# Patient Record
Sex: FEMALE | Race: WHITE | NOT HISPANIC OR LATINO | Employment: OTHER | URBAN - METROPOLITAN AREA
[De-identification: names, ages, dates, MRNs, and addresses within clinical notes are randomized per-mention and may not be internally consistent; named-entity substitution may affect disease eponyms.]

---

## 2017-05-23 ENCOUNTER — APPOINTMENT (OUTPATIENT)
Dept: LAB | Facility: HOSPITAL | Age: 62
End: 2017-05-23

## 2024-05-06 ENCOUNTER — TELEPHONE (OUTPATIENT)
Age: 69
End: 2024-05-06

## 2024-05-30 PROBLEM — Z51.81 ENCOUNTER FOR MEDICATION MONITORING: Status: ACTIVE | Noted: 2024-05-30

## 2024-05-30 PROBLEM — R53.83 OTHER FATIGUE: Status: ACTIVE | Noted: 2024-05-30

## 2024-05-30 PROBLEM — E55.9 VITAMIN D DEFICIENCY: Status: ACTIVE | Noted: 2024-05-30

## 2024-05-30 PROBLEM — M25.50 ARTHRALGIA: Status: ACTIVE | Noted: 2024-05-30

## 2024-05-30 PROBLEM — M81.0 OSTEOPOROSIS: Status: ACTIVE | Noted: 2024-05-30

## 2024-05-30 PROBLEM — M79.7 FIBROMYALGIA: Status: ACTIVE | Noted: 2024-05-30

## 2024-05-30 PROBLEM — M17.10 ARTHRITIS OF KNEE: Status: ACTIVE | Noted: 2024-05-30

## 2024-05-30 PROBLEM — E79.0 HYPERURICEMIA: Status: ACTIVE | Noted: 2024-05-30

## 2024-05-30 PROBLEM — M35.3 PMR (POLYMYALGIA RHEUMATICA): Status: ACTIVE | Noted: 2024-05-30

## 2024-05-30 NOTE — ASSESSMENT & PLAN NOTE
This has been a longstanding diagnosis for many years.    Encouraged regular low impact aerobic exercise up to 30 minutes daily  Recommended conservative measures to help sleep  She has been intolerant of gabapentin and Lyrica  She is already on Celexa  Could consider trying amitriptyline again as she reports this helped her when she took it years ago  Continue follow-up with psych

## 2024-05-30 NOTE — ASSESSMENT & PLAN NOTE
Diagnosed by [facility] rheumatology January 2021.  Prior to being seen in our office she had been unable to wean below 11 mg of prednisone daily.  She previously had GI upset with Plaquenil  7/2023 and 10/2023 elevated inflammation markers    She was last seen in October 2023.  She feels considerably worse.  She is only on 5 mg prednisone daily and reports she has had persistent muscle pain, joint pain and swelling on this dose.  She has had elevated inflammatory markers on labs 7/2023 and again 10/2023.  Muscle enzymes normal 10/23.  We will repeat labs today  She had a lot of nausea with methotrexate, even on lower doses, so she stopped taking this.  We will work on a PA for Rudyjimmy  Increase prednisone to 10 mg daily until follow-up  We will see her back in 6 to 8 weeks

## 2024-05-30 NOTE — ASSESSMENT & PLAN NOTE
A lot of her joint pain sounds degenerative in nature    Prior MRI lumbar spine with degenerative changes and scoliosis, subacute compression fracture T12  Continue Tylenol as needed  She has been intolerant of Lyrica, gabapentin, and tramadol  We referred her to pain management 7/2023 but she did not keep the appointment

## 2024-05-30 NOTE — ASSESSMENT & PLAN NOTE
She has seen sports medicine and had steroid injections in her knees with little relief.  She is considering gel injection

## 2024-05-30 NOTE — ASSESSMENT & PLAN NOTE
She has clinical osteoporosis with compression fracture at T12.  By the time compression fracture was found in March 2023 was more subacute to chronic compression fracture.  We have discussed is very important for her to wean prednisone. We will wean prednisone as able.  We initially started her on Fosamax March 2023, but she reports intermittent compliance due to dental work.  We discussed she should hold the medication for at least 3 months as she recently had a bridge.  Continue vitamin D supplement and get calcium through diet  We will arrange for DEXA

## 2024-05-31 ENCOUNTER — TELEPHONE (OUTPATIENT)
Age: 69
End: 2024-05-31

## 2024-05-31 ENCOUNTER — OFFICE VISIT (OUTPATIENT)
Age: 69
End: 2024-05-31
Payer: MEDICARE

## 2024-05-31 ENCOUNTER — LAB (OUTPATIENT)
Facility: HOSPITAL | Age: 69
End: 2024-05-31
Payer: MEDICARE

## 2024-05-31 VITALS
TEMPERATURE: 97.9 F | HEART RATE: 85 BPM | DIASTOLIC BLOOD PRESSURE: 90 MMHG | WEIGHT: 196.6 LBS | HEIGHT: 65 IN | SYSTOLIC BLOOD PRESSURE: 130 MMHG | BODY MASS INDEX: 32.76 KG/M2

## 2024-05-31 DIAGNOSIS — E79.0 HYPERURICEMIA: ICD-10-CM

## 2024-05-31 DIAGNOSIS — M35.3 PMR (POLYMYALGIA RHEUMATICA): ICD-10-CM

## 2024-05-31 DIAGNOSIS — E55.9 VITAMIN D DEFICIENCY: ICD-10-CM

## 2024-05-31 DIAGNOSIS — M17.10 ARTHRITIS OF KNEE: ICD-10-CM

## 2024-05-31 DIAGNOSIS — Z51.81 ENCOUNTER FOR MEDICATION MONITORING: ICD-10-CM

## 2024-05-31 DIAGNOSIS — M35.3 PMR (POLYMYALGIA RHEUMATICA): Primary | ICD-10-CM

## 2024-05-31 DIAGNOSIS — M79.7 FIBROMYALGIA: ICD-10-CM

## 2024-05-31 DIAGNOSIS — R53.83 OTHER FATIGUE: ICD-10-CM

## 2024-05-31 DIAGNOSIS — M81.0 OSTEOPOROSIS, UNSPECIFIED OSTEOPOROSIS TYPE, UNSPECIFIED PATHOLOGICAL FRACTURE PRESENCE: ICD-10-CM

## 2024-05-31 DIAGNOSIS — Z78.0 POST-MENOPAUSE: ICD-10-CM

## 2024-05-31 DIAGNOSIS — M25.50 ARTHRALGIA, UNSPECIFIED JOINT: ICD-10-CM

## 2024-05-31 PROBLEM — F32.9 MAJOR DEPRESSIVE DISORDER: Status: ACTIVE | Noted: 2023-05-12

## 2024-05-31 PROBLEM — R73.03 PREDIABETES: Status: ACTIVE | Noted: 2023-05-12

## 2024-05-31 LAB
ERYTHROCYTE [SEDIMENTATION RATE] IN BLOOD: 70 MM/HR (ref 0–30)
HAV IGM SERPL QL IA: NORMAL
HBV CORE IGM SERPL QL IA: NORMAL
HBV SURFACE AG SERPL QL IA: NORMAL
HCV AB SER QL: NORMAL

## 2024-05-31 PROCEDURE — 82550 ASSAY OF CK (CPK): CPT

## 2024-05-31 PROCEDURE — 86140 C-REACTIVE PROTEIN: CPT

## 2024-05-31 PROCEDURE — 36415 COLL VENOUS BLD VENIPUNCTURE: CPT

## 2024-05-31 PROCEDURE — 84550 ASSAY OF BLOOD/URIC ACID: CPT

## 2024-05-31 PROCEDURE — 85007 BL SMEAR W/DIFF WBC COUNT: CPT

## 2024-05-31 PROCEDURE — 82085 ASSAY OF ALDOLASE: CPT

## 2024-05-31 PROCEDURE — 80074 ACUTE HEPATITIS PANEL: CPT

## 2024-05-31 PROCEDURE — 85027 COMPLETE CBC AUTOMATED: CPT

## 2024-05-31 PROCEDURE — 80053 COMPREHEN METABOLIC PANEL: CPT

## 2024-05-31 PROCEDURE — 82306 VITAMIN D 25 HYDROXY: CPT

## 2024-05-31 PROCEDURE — 85652 RBC SED RATE AUTOMATED: CPT

## 2024-05-31 PROCEDURE — 86480 TB TEST CELL IMMUN MEASURE: CPT

## 2024-05-31 RX ORDER — PREDNISONE 5 MG/1
10 TABLET ORAL DAILY
Qty: 60 TABLET | Refills: 3 | Status: SHIPPED | OUTPATIENT
Start: 2024-05-31

## 2024-05-31 RX ORDER — TRAMADOL HYDROCHLORIDE 50 MG/1
50 TABLET ORAL EVERY 6 HOURS PRN
Qty: 120 TABLET | Refills: 3 | Status: CANCELLED | OUTPATIENT
Start: 2024-05-31

## 2024-05-31 NOTE — TELEPHONE ENCOUNTER
Yes I am okay with this. We are going to try this for her PMR to try to wean her prednisone. Thanks

## 2024-05-31 NOTE — PROGRESS NOTES
Office Visit       Date: 05/31/2024   Patient Name: Scot Good  MRN: [id number]  YOB: 1955    Referring Physician: Provider, No Known     Chief Complaint:   Chief Complaint   Patient presents with    Arthritis    Medication       History of Present Illness: Scot Good is a 69 y.o. female who is here today for follow-up of possible PMR.  She has a past medical history of hypertension, hyperlipidemia, fibromyalgia, GERD, and osteopenia.  She has been seen previously for fibromyalgia by Dr. Kyrie Hood.  She was also previously seeing a [facility] rheumatology nurse practitioner and was undergoing a prednisone taper since January 2021 without being able to wean below 11 mg daily historically. Her labs 10/27/2021 showed an ESR of 51 and CRP of 3.07 (<0.49).  On 4/30/2018 her CRP and sed rate were normal.    She reestablished care with ACL in March 2023.  At that time her CRP was 13 (0-10) and her sed rate was normal on prednisone 11 mg daily.  RF, CCP, WELLINGTON panel were negative.  She had an WELLINGTON 1-80 homogenous.  We also found a compression fracture of T12 at this initial office visit.     Today she rates her pain 8/10 with 1 hour a day of morning stiffness.  She reports she feels worse.    She is currently taking 5 mg of prednisone daily. Continued muscle pain and joint pain. She endorses swelling in the joints, particularly hands and wrists.  We previously prescribed her methotrexate and folic acid.  She has had considerable nausea even on lower doses so she stopped taking this.  For pain we also have prescribed her Lyrica and tramadol in the past.  She reports the Lyrica made her feel angry and made her snap at people.  Tramadol made her have constipation.  She is not taking either medication.   She had dental work in the interim so she is not taking the alendronate currently.  She reports she had a bone density test a couple years ago.  She now has a new primary care  provider.  She would like us to order a bone density test for her today.  Previously intolerant of gabapentin.  We historically referred her to pain management but she did not keep the appointment.  She has had steroid injections [facility] her right [facility]. These are not helping much. She has been approved for gel injection and is considering trying this.    Review of systems:  Review of Systems   Constitutional:  Positive for fatigue.   HENT: Negative.     Eyes: Negative.    Respiratory: Negative.     Cardiovascular: Negative.    Gastrointestinal:         Hemorrhoids   Endocrine: Negative.    Genitourinary: Negative.    Musculoskeletal:  Positive for arthralgias, back pain, gait problem, myalgias, neck pain and neck stiffness.   Skin:         Itching  Nail changes   Allergic/Immunologic: Negative.    Hematological:  Bruises/bleeds easily.   Psychiatric/Behavioral:  Positive for dysphoric mood. The patient is nervous/anxious.        Past Medical History:   Past Medical History:   Diagnosis Date    Arthritis     GERD (gastroesophageal reflux disease)     High cholesterol     Hypertension     Osteopenia        Past Surgical History:   Past Surgical History:   Procedure Laterality Date    BUNIONECTOMY      ON FOOT    TUBAL ABDOMINAL LIGATION         Family History:   Family History   Problem Relation Age of Onset    Heart disease Mother     Diabetes Father     Neuropathy Father     Neuropathy Brother        Social History:   Social History     Socioeconomic History    Marital status:    Tobacco Use    Smoking status: Never   Vaping Use    Vaping status: Never Used   Substance and Sexual Activity    Alcohol use: Not Currently    Drug use: Never    Sexual activity: Defer       Medications:   Current Outpatient Medications:     ALPRAZolam (XANAX) 0.25 MG tablet, Take 1 tablet by mouth 3 (Three) Times a Day., Disp: , Rfl:     citalopram (CeleXA) 40 MG tablet, Take 1 tablet by mouth Daily., Disp: ,  "Rfl:     lansoprazole (PREVACID) 15 MG capsule, Take 1 capsule by mouth Daily. BEFORE A MEAL, Disp: , Rfl:     potassium chloride (KLOR-CON) 8 MEQ CR tablet, Take 1 tablet by mouth Daily. WITH FOOD, Disp: , Rfl:     predniSONE (DELTASONE) 5 MG tablet, Take 2 tablets by mouth Daily., Disp: 60 tablet, Rfl: 3    triamterene-hydrochlorothiazide (DYAZIDE) 37.5-25 MG per capsule, Take 1 capsule by mouth Every Morning., Disp: , Rfl:     alendronate (FOSAMAX) 70 MG tablet, Take 1 tablet by mouth Every 7 (Seven) Days. AT LEAST 30 MINUTES BEFORE FIRST FOOD, BEVERAGE OR MEDICATION OF THE DAY (Patient not taking: Reported on 5/31/2024), Disp: , Rfl:     Allergies:   Allergies   Allergen Reactions    [facility] Provider Review Needed       I reviewed the patient's chief complaint, history of present illness, review of systems, past medical history, surgical history, family history, social history, medications and allergy list.     Objective    Vital Signs:   Vitals:    05/31/24 0845   BP: 130/90   BP Location: Left arm   Patient Position: Sitting   Cuff Size: Adult   Pulse: 85   Temp: 97.9 °F (36.6 °C)   TempSrc: Temporal   Weight: 89.2 kg (196 lb 9.6 oz)   Height: 165.1 cm (65\")   PainSc:   8   PainLoc: Generalized     Body mass index is 32.72 kg/m².   BMI cannot be calculated due to outdated height or weight values.  Please input a current height/weight in Vitals and re-renter BMIFOLLOWUP in Note to pull in correct documentation based on BMI range.       Physical Exam:  Physical Exam  Constitutional:       Appearance: Normal appearance. She is obese.   HENT:      Head: Normocephalic and atraumatic.   Eyes:      Conjunctiva/sclera: Conjunctivae normal.      Pupils: Pupils are equal, round, and reactive to light.   Cardiovascular:      Rate and Rhythm: Normal rate and regular rhythm.      Heart sounds: Normal heart sounds.   Pulmonary:      Effort: Pulmonary effort is normal.      Breath sounds: Normal breath sounds. "   Musculoskeletal:         General: Normal range of motion.      Cervical back: Normal range of motion.   Skin:     General: Skin is warm and dry.   Neurological:      General: No focal deficit present.      Mental Status: She is alert and oriented to person, place, and time.   Psychiatric:         Mood and Affect: Mood normal.         Behavior: Behavior normal.         Thought Content: Thought content normal.         Judgment: Judgment normal.          Results Review:   Imaging Results (Last 24 Hours)       ** No results found for the last 24 hours. **            Assessment / Plan    Assessment/Plan:   Diagnoses and all orders for this visit:    1. PMR (polymyalgia rheumatica) (Primary)  Assessment & Plan:  Diagnosed by [facility] rheumatology January 2021.  Prior to being seen in our office she had been unable to wean below 11 mg of prednisone daily.  She previously had GI upset with Plaquenil  7/2023 and 10/2023 elevated inflammation markers    She was last seen in October 2023.  She feels considerably worse.  She is only on 5 mg prednisone daily and reports she has had persistent muscle pain, joint pain and swelling on this dose.  She has had elevated inflammatory markers on labs 7/2023 and again 10/2023.  Muscle enzymes normal 10/23.  We will repeat labs today  She had a lot of nausea with methotrexate, even on lower doses, so she stopped taking this.  We will work on a PA for Rudyjimmy  Increase prednisone to 10 mg daily until follow-up  We will see her back in 6 to 8 weeks    Orders:  -     CK; Future  -     CBC With Manual Differential; Future  -     Comprehensive Metabolic Panel; Future  -     C-reactive Protein; Future  -     Sedimentation Rate; Future  -     Aldolase; Future  -     predniSONE (DELTASONE) 5 MG tablet; Take 2 tablets by mouth Daily.  Dispense: 60 tablet; Refill: 3  -     QuantiFERON TB Plus Client Incubated; Future  -     Hepatitis Panel, Acute; Future    2. Fibromyalgia  Assessment &  Plan:  This has been a longstanding diagnosis for many years.    Encouraged regular low impact aerobic exercise up to 30 minutes daily  Recommended conservative measures to help sleep  She has been intolerant of gabapentin and Lyrica  She is already on Celexa  Could consider trying amitriptyline again as she reports this helped her when she took it years ago  Continue follow-up with psych    Orders:  -     CK; Future  -     CBC With Manual Differential; Future  -     Comprehensive Metabolic Panel; Future  -     C-reactive Protein; Future  -     Sedimentation Rate; Future  -     Aldolase; Future  -     Vitamin D,25-Hydroxy; Future    3. Osteoporosis, unspecified osteoporosis type, unspecified pathological fracture presence  Assessment & Plan:  She has clinical osteoporosis with compression fracture at T12.  By the time compression fracture was found in March 2023 was more subacute to chronic compression fracture.  We have discussed is very important for her to wean prednisone. We will wean prednisone as able.  We initially started her on Fosamax March 2023, but she reports intermittent compliance due to dental work.  We discussed she should hold the medication for at least 3 months as she recently had a bridge.  Continue vitamin D supplement and get calcium through diet  We will arrange for DEXA      Orders:  -     DEXA Bone Density Axial; Future    4. Arthralgia, unspecified joint  Assessment & Plan:  A lot of her joint pain sounds degenerative in nature    Prior MRI lumbar spine with degenerative changes and scoliosis, subacute compression fracture T12  Continue Tylenol as needed  She has been intolerant of Lyrica, gabapentin, and tramadol  We referred her to pain management 7/2023 but she did not keep the appointment    Orders:  -     CK; Future  -     CBC With Manual Differential; Future  -     Comprehensive Metabolic Panel; Future  -     C-reactive Protein; Future  -     Sedimentation Rate; Future  -      Aldolase; Future  -     Vitamin D,25-Hydroxy; Future    5. Other fatigue  Assessment & Plan:  Check QTB and hepatitis panel today    Orders:  -     CK; Future  -     CBC With Manual Differential; Future  -     Comprehensive Metabolic Panel; Future  -     C-reactive Protein; Future  -     Sedimentation Rate; Future  -     Aldolase; Future  -     Vitamin D,25-Hydroxy; Future  -     QuantiFERON TB Plus Client Incubated; Future  -     Hepatitis Panel, Acute; Future    6. Arthritis of knee  Assessment & Plan:  She has seen sports medicine and had steroid injections in her knees with little relief.  She is considering gel injection      7. Hyperuricemia  Assessment & Plan:  3/2023 uric acid 7.9  She did not tolerate allopurinol    Orders:  -     Uric Acid; Future    8. Vitamin D deficiency  Assessment & Plan:  3/2023 vitamin D 17.4    Continue vitamin D3 2000 IU daily  Recheck vitamin D level    Orders:  -     Vitamin D,25-Hydroxy; Future    9. Encounter for medication monitoring  Assessment & Plan:  PDMP reviewed  She is no longer taking tramadol or Lyrica    Orders:  -     QuantiFERON TB Plus Client Incubated; Future  -     Hepatitis Panel, Acute; Future    10. Post-menopause  -     DEXA Bone Density Axial; Future        Follow Up:   Return in about 8 weeks (around 7/26/2024) for Dr. Hood.        JOHN PAUL Coker   [facility]

## 2024-05-31 NOTE — TELEPHONE ENCOUNTER
Please PA Kevzara for PMR.  She has failed methotrexate.  She has had a hard time weaning off prednisone

## 2024-06-01 LAB
25(OH)D3 SERPL-MCNC: 39.7 NG/ML (ref 30–100)
ALBUMIN SERPL-MCNC: 3.8 G/DL (ref 3.5–5.2)
ALBUMIN/GLOB SERPL: 1.3 G/DL
ALP SERPL-CCNC: 65 U/L (ref 39–117)
ALT SERPL W P-5'-P-CCNC: 17 U/L (ref 1–33)
ANION GAP SERPL CALCULATED.3IONS-SCNC: 16.8 MMOL/L (ref 5–15)
ANISOCYTOSIS BLD QL: ABNORMAL
AST SERPL-CCNC: 13 U/L (ref 1–32)
BASOPHILS # BLD MANUAL: 0.13 10*3/MM3 (ref 0–0.2)
BASOPHILS NFR BLD MANUAL: 1.1 % (ref 0–1.5)
BILIRUB SERPL-MCNC: 0.3 MG/DL (ref 0–1.2)
BUN SERPL-MCNC: 11 MG/DL (ref 8–23)
BUN/CREAT SERPL: 12.8 (ref 7–25)
CALCIUM SPEC-SCNC: 9.6 MG/DL (ref 8.6–10.5)
CHLORIDE SERPL-SCNC: 95 MMOL/L (ref 98–107)
CK SERPL-CCNC: 29 U/L (ref 20–180)
CO2 SERPL-SCNC: 25.2 MMOL/L (ref 22–29)
CREAT SERPL-MCNC: 0.86 MG/DL (ref 0.57–1)
CRP SERPL-MCNC: 10.58 MG/DL (ref 0–0.5)
DEPRECATED RDW RBC AUTO: 38.6 FL (ref 37–54)
EGFRCR SERPLBLD CKD-EPI 2021: 74.2 ML/MIN/1.73
EOSINOPHIL # BLD MANUAL: 0.48 10*3/MM3 (ref 0–0.4)
EOSINOPHIL NFR BLD MANUAL: 4.2 % (ref 0.3–6.2)
ERYTHROCYTE [DISTWIDTH] IN BLOOD BY AUTOMATED COUNT: 13.1 % (ref 12.3–15.4)
GLOBULIN UR ELPH-MCNC: 3 GM/DL
GLUCOSE SERPL-MCNC: 50 MG/DL (ref 65–99)
HCT VFR BLD AUTO: 35.9 % (ref 34–46.6)
HGB BLD-MCNC: 11.7 G/DL (ref 12–15.9)
LYMPHOCYTES # BLD MANUAL: 3.39 10*3/MM3 (ref 0.7–3.1)
LYMPHOCYTES NFR BLD MANUAL: 16.8 % (ref 5–12)
MCH RBC QN AUTO: 26.5 PG (ref 26.6–33)
MCHC RBC AUTO-ENTMCNC: 32.6 G/DL (ref 31.5–35.7)
MCV RBC AUTO: 81.4 FL (ref 79–97)
MICROCYTES BLD QL: ABNORMAL
MONOCYTES # BLD: 1.93 10*3/MM3 (ref 0.1–0.9)
NEUTROPHILS # BLD AUTO: 5.56 10*3/MM3 (ref 1.7–7)
NEUTROPHILS NFR BLD MANUAL: 48.4 % (ref 42.7–76)
PLAT MORPH BLD: NORMAL
PLATELET # BLD AUTO: 441 10*3/MM3 (ref 140–450)
PMV BLD AUTO: 10.2 FL (ref 6–12)
POIKILOCYTOSIS BLD QL SMEAR: ABNORMAL
POTASSIUM SERPL-SCNC: 3 MMOL/L (ref 3.5–5.2)
PROT SERPL-MCNC: 6.8 G/DL (ref 6–8.5)
RBC # BLD AUTO: 4.41 10*6/MM3 (ref 3.77–5.28)
SMUDGE CELLS BLD QL SMEAR: ABNORMAL
SODIUM SERPL-SCNC: 137 MMOL/L (ref 136–145)
URATE SERPL-MCNC: 5.9 MG/DL (ref 2.4–5.7)
VARIANT LYMPHS NFR BLD MANUAL: 24.2 % (ref 19.6–45.3)
VARIANT LYMPHS NFR BLD MANUAL: 5.3 % (ref 0–5)
WBC NRBC COR # BLD AUTO: 11.48 10*3/MM3 (ref 3.4–10.8)

## 2024-06-03 ENCOUNTER — TELEPHONE (OUTPATIENT)
Age: 69
End: 2024-06-03
Payer: MEDICARE

## 2024-06-03 DIAGNOSIS — M35.3 PMR (POLYMYALGIA RHEUMATICA): Primary | ICD-10-CM

## 2024-06-03 LAB — ALDOLASE SERPL-CCNC: 3.9 U/L (ref 3.3–10.3)

## 2024-06-03 NOTE — TELEPHONE ENCOUNTER
Please forward to [facility].   I have never seen this patient and am not sure if the treatment is indicated. I did not order this medication nor discuss it with the pt.

## 2024-06-04 LAB
GAMMA INTERFERON BACKGROUND BLD IA-ACNC: 0.04 IU/ML
M TB IFN-G BLD-IMP: NEGATIVE
M TB IFN-G CD4+ BCKGRND COR BLD-ACNC: 0 IU/ML
M TB IFN-G CD4+CD8+ BCKGRND COR BLD-ACNC: 0 IU/ML
MITOGEN IGNF BCKGRD COR BLD-ACNC: 3.19 IU/ML
SERVICE CMNT-IMP: NORMAL

## 2024-06-06 NOTE — TELEPHONE ENCOUNTER
Patient called in today and stated that she is aware that her IV Actemra has been approved but she just a message from her insurance company that the Kevzara was approved as well. She states she would rather have the injection over the infusion.     Can you look into see if this has in fact been approved and send into [facility] for review?

## 2024-06-07 NOTE — TELEPHONE ENCOUNTER
I called pt to give her the appt for her IV Actemra. She stated her insurance notified her that she is approved for Kevzara injections as well. She does not want to pursure IV Actemra, she would like to do the Kevzara. Please look into this for the pt and let her know. Thank you!

## 2024-06-12 RX ORDER — SODIUM CHLORIDE 9 MG/ML
20 INJECTION, SOLUTION INTRAVENOUS ONCE
Status: CANCELLED | OUTPATIENT
Start: 2024-06-12

## 2024-06-12 RX ORDER — DIPHENHYDRAMINE HCL 25 MG
25 CAPSULE ORAL AS NEEDED
Status: CANCELLED | OUTPATIENT
Start: 2024-06-12

## 2024-06-13 ENCOUNTER — INFUSION (OUTPATIENT)
Dept: ONCOLOGY | Facility: HOSPITAL | Age: 69
End: 2024-06-13
Payer: MEDICARE

## 2024-06-13 VITALS
RESPIRATION RATE: 18 BRPM | HEART RATE: 88 BPM | TEMPERATURE: 97.8 F | DIASTOLIC BLOOD PRESSURE: 76 MMHG | WEIGHT: 197.8 LBS | BODY MASS INDEX: 32.92 KG/M2 | SYSTOLIC BLOOD PRESSURE: 155 MMHG

## 2024-06-13 DIAGNOSIS — M35.3 PMR (POLYMYALGIA RHEUMATICA): Primary | ICD-10-CM

## 2024-06-13 PROCEDURE — 25010000002 TOCILIZUMAB 400 MG/20ML SOLUTION 20 ML VIAL: Performed by: INTERNAL MEDICINE

## 2024-06-13 PROCEDURE — 25810000003 SODIUM CHLORIDE 0.9 % SOLUTION: Performed by: INTERNAL MEDICINE

## 2024-06-13 PROCEDURE — 96365 THER/PROPH/DIAG IV INF INIT: CPT

## 2024-06-13 RX ORDER — DIPHENHYDRAMINE HCL 25 MG
25 CAPSULE ORAL AS NEEDED
OUTPATIENT
Start: 2024-07-11

## 2024-06-13 RX ORDER — METHYLPREDNISOLONE SODIUM SUCCINATE 125 MG/2ML
125 INJECTION, POWDER, LYOPHILIZED, FOR SOLUTION INTRAMUSCULAR; INTRAVENOUS AS NEEDED
OUTPATIENT
Start: 2024-07-11

## 2024-06-13 RX ORDER — SODIUM CHLORIDE 9 MG/ML
20 INJECTION, SOLUTION INTRAVENOUS ONCE
Status: COMPLETED | OUTPATIENT
Start: 2024-06-13 | End: 2024-06-13

## 2024-06-13 RX ORDER — SODIUM CHLORIDE 9 MG/ML
20 INJECTION, SOLUTION INTRAVENOUS ONCE
OUTPATIENT
Start: 2024-07-11

## 2024-06-13 RX ADMIN — TOCILIZUMAB 360 MG: 20 INJECTION, SOLUTION, CONCENTRATE INTRAVENOUS at 09:00

## 2024-06-13 RX ADMIN — SODIUM CHLORIDE 20 ML/HR: 9 INJECTION, SOLUTION INTRAVENOUS at 09:00

## 2024-06-18 ENCOUNTER — TELEPHONE (OUTPATIENT)
Age: 69
End: 2024-06-18
Payer: MEDICARE

## 2024-06-18 NOTE — TELEPHONE ENCOUNTER
----- Message from Karen BEACH sent at 6/11/2024  8:01 AM EDT -----  Regarding: Actemra  Please sign Actemra order on 6/13.  Thank you!

## 2024-06-26 ENCOUNTER — HOSPITAL ENCOUNTER (OUTPATIENT)
Dept: BONE DENSITY | Facility: HOSPITAL | Age: 69
Discharge: HOME | End: 2024-06-26
Admitting: NURSE PRACTITIONER
Payer: MEDICARE

## 2024-06-26 DIAGNOSIS — Z78.0 POST-MENOPAUSE: ICD-10-CM

## 2024-06-26 DIAGNOSIS — M81.0 OSTEOPOROSIS, UNSPECIFIED OSTEOPOROSIS TYPE, UNSPECIFIED PATHOLOGICAL FRACTURE PRESENCE: ICD-10-CM

## 2024-06-26 PROCEDURE — 77080 DXA BONE DENSITY AXIAL: CPT

## 2024-07-08 ENCOUNTER — TELEPHONE (OUTPATIENT)
Age: 69
End: 2024-07-08
Payer: MEDICARE

## 2024-07-09 ENCOUNTER — SPECIALTY PHARMACY (OUTPATIENT)
Age: 69
End: 2024-07-09
Payer: MEDICARE

## 2024-07-09 RX ORDER — TOCILIZUMAB 180 MG/ML
162 INJECTION, SOLUTION SUBCUTANEOUS
COMMUNITY
End: 2024-07-11 | Stop reason: SDUPTHER

## 2024-07-09 NOTE — TELEPHONE ENCOUNTER
Patient's Actemra Infusion costs are really high and she is wanting to switch to an injectable Actemra.  She talked to the drug company yesterday for assistance.    Patient is cancelling her next infusion.

## 2024-07-11 ENCOUNTER — SPECIALTY PHARMACY (OUTPATIENT)
Dept: GENERAL RADIOLOGY | Facility: HOSPITAL | Age: 69
End: 2024-07-11
Payer: MEDICARE

## 2024-07-11 ENCOUNTER — TELEPHONE (OUTPATIENT)
Dept: GENERAL RADIOLOGY | Facility: HOSPITAL | Age: 69
End: 2024-07-11
Payer: MEDICARE

## 2024-07-11 RX ORDER — TOCILIZUMAB 180 MG/ML
162 INJECTION, SOLUTION SUBCUTANEOUS
Qty: 1.8 ML | Refills: 3 | Status: SHIPPED | OUTPATIENT
Start: 2024-07-11

## 2024-07-11 NOTE — PROGRESS NOTES
Specialty Pharmacy Patient Management Program  Rheumatology Initial Assessment     David Good was referred by an Rheumatology provider to the Rheumatology Patient Management program offered by [facility] for Polymyalgia Rheumatica on 07/11/24.  An initial outreach was conducted, including assessment of therapy appropriateness and specialty medication education for Actemra 162mg every 14 days. The patient was introduced to services offered by [facility], including: regular assessments, refill coordination, curbside pick-up or mail order delivery options, prior authorization maintenance, and financial assistance programs as applicable. The patient was also provided with contact information for the pharmacy team.     Insurance Coverage & Financial Support  [place]     Relevant Past Medical History and Comorbidities  Relevant medical history and concomitant health conditions were discussed with the patient. The patient's chart has been reviewed for relevant past medical history and comorbid conditions and updated as necessary.  Past Medical History:   Diagnosis Date    Arthritis     GERD (gastroesophageal reflux disease)     High cholesterol     Hypertension     Osteopenia      Social History     Socioeconomic History    Marital status:    Tobacco Use    Smoking status: Never   Vaping Use    Vaping status: Never Used   Substance and Sexual Activity    Alcohol use: Not Currently    Drug use: Never    Sexual activity: Defer       Problem list reviewed by Victoriano Segura RPH on 7/11/2024 at  2:59 PM    Allergies  Known allergies and reactions were discussed with the patient. The patient's chart has been reviewed for  allergy information and updated as necessary.   Allergies   Allergen Reactions    [facility] Provider Review Needed       Allergies reviewed by Victoriano Segura RPH on 7/11/2024 at  2:58 PM    Relevant Laboratory Values  Relevant laboratory  "values were discussed with the patient. The following specialty medication dose adjustment(s) are recommended: Start Actemra 162mg every 14 days    Lab Results   Component Value Date    HGBA1C 5.8 09/18/2014     Lab Results   Component Value Date    GLUCOSE 50 (L) 05/31/2024    CALCIUM 9.6 05/31/2024     05/31/2024    K 3.0 (L) 05/31/2024    CO2 25.2 05/31/2024    CL 95 (L) 05/31/2024    BUN 11 05/31/2024    CREATININE 0.86 05/31/2024    BCR 12.8 05/31/2024    ANIONGAP 16.8 (H) 05/31/2024     No results found for: \"CHOL\", \"CHLPL\", \"TRIG\", \"HDL\", \"LDL\", \"LDLDIRECT\"      Current Medication List  This medication list has been reviewed with the patient and evaluated for any interactions or necessary modifications/recommendations, and updated to include all prescription medications, OTC medications, and supplements the patient is currently taking.  This list reflects what is contained in the patient's profile, which has also been marked as reviewed to communicate to other providers it is the most up to date version of the patient's current medication therapy.     Current Outpatient Medications:     alendronate (FOSAMAX) 70 MG tablet, Take 1 tablet by mouth Every 7 (Seven) Days. AT LEAST 30 MINUTES BEFORE FIRST FOOD, BEVERAGE OR MEDICATION OF THE DAY (Patient not taking: Reported on 5/31/2024), Disp: , Rfl:     ALPRAZolam (XANAX) 0.25 MG tablet, Take 1 tablet by mouth 3 (Three) Times a Day., Disp: , Rfl:     citalopram (CeleXA) 40 MG tablet, Take 1 tablet by mouth Daily., Disp: , Rfl:     lansoprazole (PREVACID) 15 MG capsule, Take 1 capsule by mouth Daily. BEFORE A MEAL, Disp: , Rfl:     potassium chloride (KLOR-CON) 8 MEQ CR tablet, Take 1 tablet by mouth Daily. WITH FOOD, Disp: , Rfl:     predniSONE (DELTASONE) 5 MG tablet, Take 2 tablets by mouth Daily., Disp: 60 tablet, Rfl: 3    Tocilizumab (Actemra ACTPen) 162 MG/0.9ML solution auto-injector injection, Inject 0.9 mL under the skin into the appropriate area as " directed Every 14 (Fourteen) Days., Disp: 1.8 mL, Rfl: 3    triamterene-hydrochlorothiazide (DYAZIDE) 37.5-25 MG per capsule, Take 1 capsule by mouth Every Morning., Disp: , Rfl:   No current facility-administered medications for this visit.    Medicines reviewed by Victoriano Segura [facility] on 7/11/2024 at  2:59 PM    Drug Interactions  No Clinically Significant DDIs Were Identified at Present Time Upon Marking Medications Reviewed    Initial Education Provided for Specialty Medication  The patient has been provided with the following education and any applicable administration techniques (i.e. self-injection) have been demonstrated for the therapies indicated. All questions and concerns have been addressed prior to the patient receiving the medication, and the patient has verbalized comprehension of the education and any materials provided. Additional patient education shall be provided and documented upon request by the patient, provider, or payer.          Actemra (tocilizumab)           Medication Expectations   Why am I taking this medication? You are taking this medication for polyarticular juvenile idiopathic arthritis (PIJA), systemic juvenile idiopathic arthritis (SJIA), rheumatoid arthritis, giant cell arteritis, or systemic sclerosis-associated interstitial lung disease.   What should I expect while on this medication? You should expect a decrease in the frequency and severity of symptoms.   How does the medication work? Tocilizumab is a humanized IL-6 receptor-inhibiting monoclonal antibody. Inhibiting IL-6 receptors leads to a reduction in the production of proinflammatory cytokines.   How long will I be on this medication for? The amount of time you will be on this medication will be determined by your doctor and your response to the medication.    How do I take this medication? Take as directed on your prescription label.  This medication is a subcutaneous injection given in the fatty part of the  skin on the top of the thigh, upper outer arm, or stomach area. You should rotate injection sites. Allow to reach room temperature prior to injection (~30 minutes for syringe or ~45 minutes for auto-injector).   What are some possible side effects? Injection site reactions and hypersensitivity reactions, increased liver enzymes or cholesterol, constipation or diarrhea, high blood pressure, signs of a common cold or upper respiratory tract infection, or infusion-related reaction (flushing, chills, headache).   What happens if I miss a dose? If you miss a dose, take it as soon as you remember. If it is close to the time for your next dose, skip the missed dose and go back to your normal time.                    Medication Safety   What are things I should warn my doctor immediately about? Allergic reaction such has hives or trouble breathing. If you develop symptoms of infection that do not go away, weight loss, changes in bowel habits or severe abdominal pain, or muscle pain or weakness.     What are things that I should be cautious of? Injection site reaction and upper respiratory infection. Be sure to complete any follow-up labs ordered by your doctor. You may have more chance of getting an infection.  Wash your hands often and stay away from people with infections, colds, or flu.   What are some medications that can interact with this one? Immunosuppressants and vaccines.            Medication Storage/Handling   How should I handle this medication? Keep this medication our of reach of pets/children in original container. Store in the original container to protect from light. Do not freeze or shake. Do not inject where the skin is tender, bruised, red, hard, or where there are moles, scars, or stretch marks.   How does this medication need to be stored? Store in refrigerator and keep dry. If needed, you may store at room temperature for up to 24 hours. Do not return to fridge once stored at room temperature.    How  should I dispose of this medication? You can dispose of the syringe in a sharps container, and if this is not available you may use an empty hard plastic container such as a milk jug or tide container. Discard any unused portion or if stored outside of refrigerator > 5 days.            Resources/Support   How can I remind myself to take this medication? You can download a reminder [email or web address] on your phone or use a calandar  to help with your injection.   Is financial support available?  Yes, [employer] can provide co-pay assisstance if you have commercial insurance or patient assistance if you have Medicare or no insurance.    Which vaccines are recommended for me? Talk to your doctor about these vaccines: Flu, Coronavirus (COVID-19), Pneumococcal (pneumonia), Tdap, Hepatitis B, Zoster (shingles).               Adherence and Self-Administration  Adherence related to the patient's specialty therapy was discussed with the patient. The Adherence segment of this outreach has been reviewed and updated.     Is there a concern with patient's ability to self administer the medication correctly and without issue?: No  Were any potential barriers to adherence identified during the initial assessment or patient education?: No  Are there any concerns regarding the patient's understanding of the importance of medication adherence?: No  Methods for Supporting Patient Adherence and/or Self-Administration: Did a video assessment to educate the patient on proper administration. The patient shows good understanding on proper administration technique.     Open Medication Therapy Problems  No medication therapy recommendations to display    Goals of Therapy  Goals related to the patient's specialty therapy were discussed with the patient. The Patient Goals segment of this outreach has been reviewed and updated.   Goals Addressed Today        Specialty Pharmacy General Goal      Reduce number of flares and pain score.               Reassessment  Plan & Follow-Up  1. Medication Therapy Changes: Start Actemra 162mg every 14 days   2. Related Plans, Therapy Recommendations, or Therapy Problems to Be Addressed: Follow up to monitor for a reduction in flares and pain score.  3. Pharmacist to perform regular assessments no more than (6) months from the previous assessment.  4. Care Coordinator to set up future refill outreaches, coordinate prescription delivery, and escalate clinical questions to pharmacist.  5. Welcome information and patient satisfaction survey to be sent by specialty pharmacy team with patient's initial fill.    Attestation  Therapeutic appropriateness: Appropriate   I attest the patient was actively involved in and has agreed to the above plan of care. If the prescribed therapy is at any point deemed not appropriate based on the current or future assessments, a consultation will be initiated with the patient's specialty care provider to determine the best course of action. The revised plan of therapy will be documented along with any required assessments and/or additional patient education provided.     Victoriano Segura, PharmD  Clinical Specialty Pharmacist, Rheumatology  7/11/2024  15:03 EDT

## 2024-07-31 ENCOUNTER — LAB (OUTPATIENT)
Facility: HOSPITAL | Age: 69
End: 2024-07-31
Payer: MEDICARE

## 2024-07-31 ENCOUNTER — OFFICE VISIT (OUTPATIENT)
Age: 69
End: 2024-07-31
Payer: MEDICARE

## 2024-07-31 VITALS
DIASTOLIC BLOOD PRESSURE: 70 MMHG | TEMPERATURE: 98.3 F | HEART RATE: 87 BPM | BODY MASS INDEX: 33.04 KG/M2 | HEIGHT: 65 IN | WEIGHT: 198.3 LBS | SYSTOLIC BLOOD PRESSURE: 112 MMHG

## 2024-07-31 DIAGNOSIS — Z79.899 IMMUNOSUPPRESSION DUE TO DRUG THERAPY: ICD-10-CM

## 2024-07-31 DIAGNOSIS — M35.3 PMR (POLYMYALGIA RHEUMATICA): Primary | ICD-10-CM

## 2024-07-31 DIAGNOSIS — E79.0 HYPERURICEMIA: ICD-10-CM

## 2024-07-31 DIAGNOSIS — M17.10 ARTHRITIS OF KNEE: ICD-10-CM

## 2024-07-31 DIAGNOSIS — R53.83 OTHER FATIGUE: ICD-10-CM

## 2024-07-31 DIAGNOSIS — D84.821 IMMUNOSUPPRESSION DUE TO DRUG THERAPY: ICD-10-CM

## 2024-07-31 DIAGNOSIS — M35.3 PMR (POLYMYALGIA RHEUMATICA): ICD-10-CM

## 2024-07-31 DIAGNOSIS — M81.0 OSTEOPOROSIS, UNSPECIFIED OSTEOPOROSIS TYPE, UNSPECIFIED PATHOLOGICAL FRACTURE PRESENCE: ICD-10-CM

## 2024-07-31 DIAGNOSIS — M25.50 ARTHRALGIA, UNSPECIFIED JOINT: ICD-10-CM

## 2024-07-31 DIAGNOSIS — M79.7 FIBROMYALGIA: ICD-10-CM

## 2024-07-31 DIAGNOSIS — E55.9 VITAMIN D DEFICIENCY: ICD-10-CM

## 2024-07-31 DIAGNOSIS — Z51.81 ENCOUNTER FOR MEDICATION MONITORING: ICD-10-CM

## 2024-07-31 LAB
BASOPHILS # BLD AUTO: 0.15 10*3/MM3 (ref 0–0.2)
BASOPHILS NFR BLD AUTO: 1.4 % (ref 0–1.5)
DEPRECATED RDW RBC AUTO: 48.8 FL (ref 37–54)
EOSINOPHIL # BLD AUTO: 0.12 10*3/MM3 (ref 0–0.4)
EOSINOPHIL NFR BLD AUTO: 1.2 % (ref 0.3–6.2)
ERYTHROCYTE [DISTWIDTH] IN BLOOD BY AUTOMATED COUNT: 16.4 % (ref 12.3–15.4)
HCT VFR BLD AUTO: 45 % (ref 34–46.6)
HGB BLD-MCNC: 14.5 G/DL (ref 12–15.9)
IMM GRANULOCYTES # BLD AUTO: 0.02 10*3/MM3 (ref 0–0.05)
IMM GRANULOCYTES NFR BLD AUTO: 0.2 % (ref 0–0.5)
LYMPHOCYTES # BLD AUTO: 1.86 10*3/MM3 (ref 0.7–3.1)
LYMPHOCYTES NFR BLD AUTO: 18 % (ref 19.6–45.3)
MCH RBC QN AUTO: 26.9 PG (ref 26.6–33)
MCHC RBC AUTO-ENTMCNC: 32.2 G/DL (ref 31.5–35.7)
MCV RBC AUTO: 83.5 FL (ref 79–97)
MONOCYTES # BLD AUTO: 0.71 10*3/MM3 (ref 0.1–0.9)
MONOCYTES NFR BLD AUTO: 6.9 % (ref 5–12)
NEUTROPHILS NFR BLD AUTO: 7.49 10*3/MM3 (ref 1.7–7)
NEUTROPHILS NFR BLD AUTO: 72.3 % (ref 42.7–76)
NRBC BLD AUTO-RTO: 0 /100 WBC (ref 0–0.2)
PLATELET # BLD AUTO: 413 10*3/MM3 (ref 140–450)
PMV BLD AUTO: 10.4 FL (ref 6–12)
RBC # BLD AUTO: 5.39 10*6/MM3 (ref 3.77–5.28)
WBC NRBC COR # BLD AUTO: 10.35 10*3/MM3 (ref 3.4–10.8)

## 2024-07-31 PROCEDURE — 99214 OFFICE O/P EST MOD 30 MIN: CPT | Performed by: INTERNAL MEDICINE

## 2024-07-31 PROCEDURE — 1159F MED LIST DOCD IN RCRD: CPT | Performed by: INTERNAL MEDICINE

## 2024-07-31 PROCEDURE — 80053 COMPREHEN METABOLIC PANEL: CPT

## 2024-07-31 PROCEDURE — 85025 COMPLETE CBC W/AUTO DIFF WBC: CPT

## 2024-07-31 PROCEDURE — 1160F RVW MEDS BY RX/DR IN RCRD: CPT | Performed by: INTERNAL MEDICINE

## 2024-07-31 PROCEDURE — 86140 C-REACTIVE PROTEIN: CPT

## 2024-07-31 PROCEDURE — 36415 COLL VENOUS BLD VENIPUNCTURE: CPT

## 2024-07-31 PROCEDURE — 85652 RBC SED RATE AUTOMATED: CPT

## 2024-07-31 PROCEDURE — 3074F SYST BP LT 130 MM HG: CPT | Performed by: INTERNAL MEDICINE

## 2024-07-31 PROCEDURE — 3078F DIAST BP <80 MM HG: CPT | Performed by: INTERNAL MEDICINE

## 2024-07-31 RX ORDER — PREDNISONE 1 MG/1
3 TABLET ORAL DAILY
Qty: 120 TABLET | Refills: 2 | Status: SHIPPED | OUTPATIENT
Start: 2024-07-31

## 2024-07-31 RX ORDER — TOCILIZUMAB 180 MG/ML
162 INJECTION, SOLUTION SUBCUTANEOUS
Qty: 1.8 ML | Refills: 3 | Status: SHIPPED | OUTPATIENT
Start: 2024-07-31

## 2024-07-31 RX ORDER — PREDNISONE 1 MG/1
4 TABLET ORAL DAILY
COMMUNITY
End: 2024-07-31 | Stop reason: SDUPTHER

## 2024-07-31 RX ORDER — PREDNISONE 5 MG/1
5 TABLET ORAL DAILY
Qty: 90 TABLET | Refills: 1 | Status: SHIPPED | OUTPATIENT
Start: 2024-07-31

## 2024-07-31 RX ORDER — ATORVASTATIN CALCIUM 10 MG/1
10 TABLET, FILM COATED ORAL DAILY
COMMUNITY

## 2024-07-31 NOTE — ASSESSMENT & PLAN NOTE
She has clinical osteoporosis with compression fracture at T12.  By the time compression fracture was found in March 2023 was more subacute to chronic compression fracture.  We have discussed is very important for her to wean prednisone. We will wean prednisone as able.  We initially started her on Fosamax March 2023, but she reports intermittent compliance due to dental work.  We discussed she should hold the medication for at least 3 months as she recently had a bridge.  Continue vitamin D supplement and get calcium through diet  6/26/2024 DEXA scan revealed lumbar spine T-score of -0.2, total hip T-score -0.1, and femoral neck T-score of -1.2.  I will hold Fosamax for her dental work. If we can get her off prednisone, she will likely not need treatment.

## 2024-07-31 NOTE — ASSESSMENT & PLAN NOTE
This has been a longstanding diagnosis for many years.     Encouraged regular low impact aerobic exercise up to 30 minutes daily  Recommended conservative measures to help sleep  She has been intolerant of gabapentin and Lyrica  She is already on Celexa  Continue follow-up with psych

## 2024-07-31 NOTE — PROGRESS NOTES
Office Follow Up      Date: 07/31/2024   Patient Name: David Good  MRN: [id number]  YOB: 1955    Referring Physician: Provider, No Known     Chief Complaint: Pain      History of Present Illness: David Good is a 69 y.o. female who is here today for follow up on possible PMR and fibromyalgia and osteopenia.  She was also previously seeing a [facility] rheumatology nurse practitioner and was undergoing a prednisone taper since January 2021 without being able to wean below 11 mg daily historically. Her labs 10/27/2021 showed an ESR of 51 and CRP of 3.07 (<0.49).  On 4/30/2018 her CRP and sed rate were normal.     She reestablished care with ACL in March 2023.  At that time her CRP was 13 (0-10) and her sed rate was normal on prednisone 11 mg daily.  RF, CCP, WELLINGTON panel were negative.  She had an WELLINGTON 1-80 homogenous.  We also found a compression fracture of T12 at this initial office visit.      Last visit she was started on Actemra. One infusion in June but too expensive. Then switched to injections of Actemra. Had 2 injections and is much better. Now on 9 mg of prednisone.   Today she rates her pain 3/10 with 1 hour a day of morning stiffness.  She reports she feel much better.     She is currently taking 9 mg of prednisone daily. She is having little pain.   She is intolerant of Lyrica and tramadol.  She had dental work in the interim so she is not taking the alendronate currently.  Previously intolerant of gabapentin.  We historically referred her to pain management but she did not keep the appointment.  She has had steroid injections in her right knee [facility]. These are not helping much. She has been approved for gel injection and is considering trying this.  No problems with infusions or reactions.   No visual problems, jaw, or tongue claudication.     Subjective   Review of Systems: Review of Systems   Constitutional:  Positive for fatigue.  Negative for chills, fever and unexpected weight loss.   HENT:  Negative for dental problem, mouth sores, sinus pressure and swollen glands.    Eyes:  Negative for photophobia, pain and redness.   Respiratory:  Negative for apnea, cough, chest tightness and shortness of breath.    Cardiovascular:  Negative for chest pain and leg swelling.   Gastrointestinal:  Negative for abdominal pain, diarrhea, nausea and GERD.   Endocrine:        Hot flashes     Genitourinary:  Negative for dysuria and hematuria.   Musculoskeletal:  Positive for arthralgias, back pain (low back pain) and myalgias.        Morning stiffness     Skin:  Positive for bruise. Negative for dry skin, rash and skin lesions.        Itching     Neurological:  Negative for dizziness, seizures, syncope, weakness, headache and memory problem.   Hematological:  Negative for adenopathy. Does not bruise/bleed easily.   Psychiatric/Behavioral:  Positive for depressed mood. Negative for sleep disturbance, suicidal ideas and stress. The patient is not nervous/anxious.    All other systems reviewed and are negative.       Medications:   Current Outpatient Medications:     ALPRAZolam (XANAX) 0.25 MG tablet, Take 1 tablet by mouth As Needed for Anxiety or Sleep., Disp: , Rfl:     atorvastatin (LIPITOR) 10 MG tablet, Take 1 tablet by mouth Daily., Disp: , Rfl:     citalopram (CeleXA) 40 MG tablet, Take 1 tablet by mouth Daily., Disp: , Rfl:     lansoprazole (PREVACID) 15 MG capsule, Take 1 capsule by mouth Daily. BEFORE A MEAL, Disp: , Rfl:     potassium chloride (KLOR-CON) 8 MEQ CR tablet, Take 1 tablet by mouth Daily. WITH FOOD, Disp: , Rfl:     predniSONE (DELTASONE) 1 MG tablet, Take 3 tablets by mouth Daily., Disp: 120 tablet, Rfl: 2    Tocilizumab (Actemra ACTPen) 162 MG/0.9ML solution auto-injector injection, Inject 0.9 mL under the skin into the appropriate area as directed Every 14 (Fourteen) Days., Disp: 1.8 mL, Rfl: 3    triamterene-hydrochlorothiazide  "(DYAZIDE) 37.5-25 MG per capsule, Take 1 capsule by mouth Every Morning., Disp: , Rfl:     alendronate (FOSAMAX) 70 MG tablet, Take 1 tablet by mouth Every 7 (Seven) Days. AT LEAST 30 MINUTES BEFORE FIRST FOOD, BEVERAGE OR MEDICATION OF THE DAY (Patient not taking: Reported on 7/31/2024), Disp: , Rfl:     predniSONE (DELTASONE) 5 MG tablet, Take 1 tablet by mouth Daily., Disp: 90 tablet, Rfl: 1    Allergies:   Allergies   Allergen Reactions    [facility] Provider Review Needed       I have reviewed and updated the patient's chief complaint, history of present illness, review of systems, past medical history, surgical history, family history, social history, medications and allergy list as appropriate.     Objective    Vital Signs:   Vitals:    07/31/24 1536   BP: 112/70   Pulse: 87   Temp: 98.3 °F (36.8 °C)   Weight: 89.9 kg (198 lb 4.8 oz)   Height: 165.1 cm (65\")   PainSc:   3   PainLoc: Back     Body mass index is 33 kg/m².    Physical Exam:  General: The patient is well-developed and well nourished. Cooperative, alert and oriented x3. Affect is normal. Hydration appears normal.   HEENT: Normocephalic and atraumatic. No notable alopecia. Lids and conjunctiva are normal. Pupils are equal and sclera are clear. Oropharynx is clear.  No ropey or tender temporal vessels.  NECK: Supple without adenopathy, masses or thyromegaly.   CARDIOVASCULAR: Regular rate and rhythm. No murmurs, rubs or gallops   LUNGS: Effort is normal. Lungs are clear bilaterally.  ABDOMEN: Soft and non-tender without masses or hepatosplenomegaly..   EXTREMITIES: No edema.  No cyanosis or clubbing.  SKIN: Inspection and palpation are normal.  NEUROLOGIC: Gait is normal.    MUSCULOSKELETAL: Complete joint exam was performed. There was full range of motion of the shoulders, elbows, wrists and hands without soft tissue swelling synovitis or deformities except as noted.  Shoulders have painless range of motion.  Degenerative changes are present in the " hands.  Hips have good flexion and internal and external rotation.  Knees have no palpable effusions.  There is full extension and flexion.  Ankles have no soft tissue swelling or synovitis.  BACK:  Straight without scoliosis.  She does have fibromyalgia tender points.    Assessment / Plan      Assessment & Plan  PMR (polymyalgia rheumatica)  Diagnosed by [facility] rheumatology January 2021.  Prior to being seen in our office she had been unable to wean below 11 mg of prednisone daily.  She previously had GI upset with Plaquenil  7/2023 and 10/2023 elevated inflammation markers  Actemra started 6/24.     She is much better on Actemra. No significant pain.  She is on 9 mg of prednisone.   No GCA symptoms.   Decrease prednisone by 1 mg every 2-4 weeks.   We will see her back in 8 weeks  Fibromyalgia  This has been a longstanding diagnosis for many years.     Encouraged regular low impact aerobic exercise up to 30 minutes daily  Recommended conservative measures to help sleep  She has been intolerant of gabapentin and Lyrica  She is already on Celexa  Continue follow-up with psych  Osteoporosis, unspecified osteoporosis type, unspecified pathological fracture presence  She has clinical osteoporosis with compression fracture at T12.  By the time compression fracture was found in March 2023 was more subacute to chronic compression fracture.  We have discussed is very important for her to wean prednisone. We will wean prednisone as able.  We initially started her on Fosamax March 2023, but she reports intermittent compliance due to dental work.  We discussed she should hold the medication for at least 3 months as she recently had a bridge.  Continue vitamin D supplement and get calcium through diet  6/26/2024 DEXA scan revealed lumbar spine T-score of -0.2, total hip T-score -0.1, and femoral neck T-score of -1.2.  I will hold Fosamax for her dental work. If we can get her off prednisone, she will likely not need  treatment.   Arthralgia, unspecified joint  A lot of her joint pain sounds degenerative in nature     Prior MRI lumbar spine with degenerative changes and scoliosis, subacute compression fracture T12. The fracture occurred in 1996.   Continue Tylenol as needed  She has been intolerant of Lyrica, gabapentin, and tramadol  We referred her to pain management 7/2023 but she did not keep the appointment  Other fatigue    Arthritis of knee  She has seen sports medicine and had steroid injections in her knees with little relief.  She is considering gel injection   Hyperuricemia  3/2023 uric acid 7.9  No h/o gout flares.   Vitamin D deficiency  3/2023 vitamin D 17.4     Continue vitamin D3 2000 IU daily  Recheck vitamin D level  Encounter for medication monitoring  She is no longer taking tramadol or Lyrica   Immunosuppression due to drug therapy  Actemra.   No infections.   Check CBC and CMP.     Orders Placed This Encounter   Procedures    Comprehensive Metabolic Panel    CBC Auto Differential    C-reactive Protein    Sedimentation Rate     New Medications Ordered This Visit   Medications    predniSONE (DELTASONE) 5 MG tablet     Sig: Take 1 tablet by mouth Daily.     Dispense:  90 tablet     Refill:  1    predniSONE (DELTASONE) 1 MG tablet     Sig: Take 3 tablets by mouth Daily.     Dispense:  120 tablet     Refill:  2    Tocilizumab (Actemra ACTPen) 162 MG/0.9ML solution auto-injector injection     Sig: Inject 0.9 mL under the skin into the appropriate area as directed Every 14 (Fourteen) Days.     Dispense:  1.8 mL     Refill:  3          Follow Up:   Return in about 2 months (around 9/30/2024).        Kyrie Hood MD  [facility]

## 2024-07-31 NOTE — ASSESSMENT & PLAN NOTE
A lot of her joint pain sounds degenerative in nature     Prior MRI lumbar spine with degenerative changes and scoliosis, subacute compression fracture T12. The fracture occurred in 1996.   Continue Tylenol as needed  She has been intolerant of Lyrica, gabapentin, and tramadol  We referred her to pain management 7/2023 but she did not keep the appointment

## 2024-07-31 NOTE — ASSESSMENT & PLAN NOTE
Diagnosed by [facility] rheumatology January 2021.  Prior to being seen in our office she had been unable to wean below 11 mg of prednisone daily.  She previously had GI upset with Plaquenil  7/2023 and 10/2023 elevated inflammation markers  Actemra started 6/24.     She is much better on Actemra. No significant pain.  She is on 9 mg of prednisone.   No GCA symptoms.   Decrease prednisone by 1 mg every 2-4 weeks.   We will see her back in 8 weeks

## 2024-08-01 ENCOUNTER — SPECIALTY PHARMACY (OUTPATIENT)
Age: 69
End: 2024-08-01
Payer: MEDICARE

## 2024-08-01 LAB
ALBUMIN SERPL-MCNC: 4.3 G/DL (ref 3.5–5.2)
ALBUMIN/GLOB SERPL: 1.4 G/DL
ALP SERPL-CCNC: 59 U/L (ref 39–117)
ALT SERPL W P-5'-P-CCNC: 22 U/L (ref 1–33)
ANION GAP SERPL CALCULATED.3IONS-SCNC: 13 MMOL/L (ref 5–15)
AST SERPL-CCNC: 18 U/L (ref 1–32)
BILIRUB SERPL-MCNC: 0.3 MG/DL (ref 0–1.2)
BUN SERPL-MCNC: 17 MG/DL (ref 8–23)
BUN/CREAT SERPL: 18.3 (ref 7–25)
CALCIUM SPEC-SCNC: 10 MG/DL (ref 8.6–10.5)
CHLORIDE SERPL-SCNC: 99 MMOL/L (ref 98–107)
CO2 SERPL-SCNC: 25 MMOL/L (ref 22–29)
CREAT SERPL-MCNC: 0.93 MG/DL (ref 0.57–1)
CRP SERPL-MCNC: 0.94 MG/DL (ref 0–0.5)
EGFRCR SERPLBLD CKD-EPI 2021: 67.5 ML/MIN/1.73
ERYTHROCYTE [SEDIMENTATION RATE] IN BLOOD: 23 MM/HR (ref 0–30)
GLOBULIN UR ELPH-MCNC: 3 GM/DL
GLUCOSE SERPL-MCNC: 120 MG/DL (ref 65–99)
POTASSIUM SERPL-SCNC: 3.5 MMOL/L (ref 3.5–5.2)
PROT SERPL-MCNC: 7.3 G/DL (ref 6–8.5)
SODIUM SERPL-SCNC: 137 MMOL/L (ref 136–145)

## 2024-08-01 NOTE — PROGRESS NOTES
"Specialty Pharmacy Refill Coordination Note     David \"Scot\" is a 69 y.o. female contacted today regarding refills of  Actemra specialty medication(s).    Reviewed and verified with patient: Yes      Specialty medication(s) and dose(s) confirmed: yes    Refill Questions      Flowsheet Row Most Recent Value   Changes to allergies? No   Changes to medications? No   New conditions or infections since last clinic visit No   Unplanned office visit, urgent care, ED, or hospital admission in the last 4 weeks  No   How does patient/caregiver feel medication is working? Very good   Financial problems or insurance changes  No   Since the previous refill, were any specialty medication doses or scheduled injections missed or delayed?  No   Does this patient require a clinical escalation to a pharmacist? No            Delivery Questions      Flowsheet Row Most Recent Value   Delivery method [employer]   Delivery address verified with patient/caregiver? Yes   Delivery address Home   Number of medications in delivery 1   Medication(s) being filled and delivered Tocilizumab   Doses left of specialty medications 0   Copay verified? Yes   Copay amount $11.20   Copay form of payment Credit/debit on file   Ship Date 8/1/24   Delivery Date 8/2/24                   Follow-up: 21 day(s)     Ronaldo Allan, Pharmacy Technician  Specialty Pharmacy Technician        "

## 2024-08-22 ENCOUNTER — SPECIALTY PHARMACY (OUTPATIENT)
Age: 69
End: 2024-08-22
Payer: MEDICARE

## 2024-08-22 NOTE — PROGRESS NOTES
"Specialty Pharmacy Refill Coordination Note     David \"Scot\" is a 69 y.o. female contacted today regarding refills of   Actemra specialty medication(s).    Reviewed and verified with patient:     yes  Specialty medication(s) and dose(s) confirmed: yes    Refill Questions      Flowsheet Row Most Recent Value   Changes to allergies? No   Changes to medications? No   New conditions or infections since last clinic visit No   Unplanned office visit, urgent care, ED, or hospital admission in the last 4 weeks  No   How does patient/caregiver feel medication is working? Good   Financial problems or insurance changes  No   Since the previous refill, were any specialty medication doses or scheduled injections missed or delayed?  No   Does this patient require a clinical escalation to a pharmacist? No            Delivery Questions      Flowsheet Row Most Recent Value   Delivery method [employer]   Delivery address verified with patient/caregiver? Yes   Delivery address Home   Number of medications in delivery 1   Medication(s) being filled and delivered Tocilizumab   Doses left of specialty medications 0   Copay verified? Yes   Copay amount 11.20   Copay form of payment Credit/debit on file   Ship Date 08/26/2024   Delivery Date 08/27/2024   Signature Required No                   Follow-up: 21 day(s)     Li Astorga, Pharmacy Technician  Specialty Pharmacy Technician        "

## 2025-08-28 ENCOUNTER — LAB (OUTPATIENT)
Dept: LAB | Facility: HOSPITAL | Age: 70
End: 2025-08-28
Payer: MEDICARE

## 2025-08-28 ENCOUNTER — OFFICE VISIT (OUTPATIENT)
Dept: INTERNAL MEDICINE | Facility: CLINIC | Age: 70
End: 2025-08-28
Payer: MEDICARE

## 2025-08-28 VITALS
WEIGHT: 207 LBS | TEMPERATURE: 98.4 F | BODY MASS INDEX: 34.49 KG/M2 | SYSTOLIC BLOOD PRESSURE: 124 MMHG | HEIGHT: 65 IN | DIASTOLIC BLOOD PRESSURE: 76 MMHG | HEART RATE: 84 BPM

## 2025-08-28 DIAGNOSIS — E55.9 VITAMIN D DEFICIENCY: ICD-10-CM

## 2025-08-28 DIAGNOSIS — I10 HYPERTENSION, UNSPECIFIED TYPE: ICD-10-CM

## 2025-08-28 DIAGNOSIS — M35.3 PMR (POLYMYALGIA RHEUMATICA): ICD-10-CM

## 2025-08-28 DIAGNOSIS — Z13.1 DIABETES MELLITUS SCREENING: ICD-10-CM

## 2025-08-28 DIAGNOSIS — E78.2 MIXED HYPERLIPIDEMIA: ICD-10-CM

## 2025-08-28 DIAGNOSIS — Z00.00 ENCOUNTER FOR MEDICAL EXAMINATION TO ESTABLISH CARE: Primary | ICD-10-CM

## 2025-08-28 DIAGNOSIS — M79.7 FIBROMYALGIA: ICD-10-CM

## 2025-08-28 DIAGNOSIS — Z13.29 SCREENING FOR ENDOCRINE DISORDER: ICD-10-CM

## 2025-08-28 DIAGNOSIS — Z12.11 COLON CANCER SCREENING: ICD-10-CM

## 2025-08-28 DIAGNOSIS — F33.9 RECURRENT MAJOR DEPRESSIVE DISORDER, REMISSION STATUS UNSPECIFIED: ICD-10-CM

## 2025-08-28 DIAGNOSIS — E87.6 HYPOKALEMIA: ICD-10-CM

## 2025-08-28 DIAGNOSIS — Z00.00 ENCOUNTER FOR MEDICAL EXAMINATION TO ESTABLISH CARE: ICD-10-CM

## 2025-08-28 LAB
ALBUMIN SERPL-MCNC: 4.3 G/DL (ref 3.5–5.2)
ALBUMIN/GLOB SERPL: 1.3 G/DL
ALP SERPL-CCNC: 71 U/L (ref 39–117)
ALT SERPL W P-5'-P-CCNC: 18 U/L (ref 1–33)
ANION GAP SERPL CALCULATED.3IONS-SCNC: 18 MMOL/L (ref 5–15)
AST SERPL-CCNC: 18 U/L (ref 1–32)
BILIRUB SERPL-MCNC: 0.3 MG/DL (ref 0–1.2)
BUN SERPL-MCNC: 12 MG/DL (ref 8–23)
BUN/CREAT SERPL: 13.5 (ref 7–25)
CALCIUM SPEC-SCNC: 10.1 MG/DL (ref 8.6–10.5)
CHLORIDE SERPL-SCNC: 101 MMOL/L (ref 98–107)
CHOLEST SERPL-MCNC: 256 MG/DL (ref 0–200)
CO2 SERPL-SCNC: 21 MMOL/L (ref 22–29)
CREAT SERPL-MCNC: 0.89 MG/DL (ref 0.57–1)
CRP SERPL-MCNC: 2.06 MG/DL (ref 0–0.5)
EGFRCR SERPLBLD CKD-EPI 2021: 70.7 ML/MIN/1.73
GLOBULIN UR ELPH-MCNC: 3.4 GM/DL
GLUCOSE SERPL-MCNC: 104 MG/DL (ref 65–99)
HDLC SERPL-MCNC: 60 MG/DL (ref 40–60)
LDLC SERPL CALC-MCNC: 160 MG/DL (ref 0–100)
LDLC/HDLC SERPL: 2.61 {RATIO}
POTASSIUM SERPL-SCNC: 3.9 MMOL/L (ref 3.5–5.2)
PROT SERPL-MCNC: 7.7 G/DL (ref 6–8.5)
SODIUM SERPL-SCNC: 140 MMOL/L (ref 136–145)
TRIGL SERPL-MCNC: 197 MG/DL (ref 0–150)
TSH SERPL DL<=0.05 MIU/L-ACNC: 1.41 UIU/ML (ref 0.27–4.2)
VLDLC SERPL-MCNC: 36 MG/DL (ref 5–40)

## 2025-08-28 PROCEDURE — 36415 COLL VENOUS BLD VENIPUNCTURE: CPT

## 2025-08-28 PROCEDURE — 82607 VITAMIN B-12: CPT

## 2025-08-28 PROCEDURE — 86140 C-REACTIVE PROTEIN: CPT

## 2025-08-28 PROCEDURE — 84443 ASSAY THYROID STIM HORMONE: CPT

## 2025-08-28 PROCEDURE — 83036 HEMOGLOBIN GLYCOSYLATED A1C: CPT

## 2025-08-28 PROCEDURE — 85025 COMPLETE CBC W/AUTO DIFF WBC: CPT

## 2025-08-28 PROCEDURE — 82306 VITAMIN D 25 HYDROXY: CPT

## 2025-08-28 PROCEDURE — 80053 COMPREHEN METABOLIC PANEL: CPT

## 2025-08-28 PROCEDURE — 80061 LIPID PANEL: CPT

## 2025-08-28 PROCEDURE — 85652 RBC SED RATE AUTOMATED: CPT

## 2025-08-28 RX ORDER — AMLODIPINE BESYLATE 5 MG/1
1 TABLET ORAL DAILY
COMMUNITY
Start: 2025-07-28

## 2025-08-28 RX ORDER — PREDNISONE 2.5 MG/1
5 TABLET ORAL DAILY
COMMUNITY
Start: 2025-03-21

## 2025-08-28 RX ORDER — POTASSIUM CHLORIDE 750 MG/1
1 TABLET, EXTENDED RELEASE ORAL DAILY
COMMUNITY
Start: 2025-08-14

## 2025-08-28 RX ORDER — CHOLECALCIFEROL (VITAMIN D3) 25 MCG
1000 TABLET ORAL DAILY
COMMUNITY

## 2025-08-29 LAB
25(OH)D3 SERPL-MCNC: 34.2 NG/ML (ref 30–100)
BASOPHILS # BLD AUTO: 0.14 10*3/MM3 (ref 0–0.2)
BASOPHILS NFR BLD AUTO: 1.3 % (ref 0–1.5)
DEPRECATED RDW RBC AUTO: 42.6 FL (ref 37–54)
EOSINOPHIL # BLD AUTO: 0.11 10*3/MM3 (ref 0–0.4)
EOSINOPHIL NFR BLD AUTO: 1 % (ref 0.3–6.2)
ERYTHROCYTE [DISTWIDTH] IN BLOOD BY AUTOMATED COUNT: 13.8 % (ref 12.3–15.4)
ERYTHROCYTE [SEDIMENTATION RATE] IN BLOOD: 37 MM/HR (ref 0–30)
HBA1C MFR BLD: 6.1 % (ref 4.8–5.6)
HCT VFR BLD AUTO: 41.7 % (ref 34–46.6)
HGB BLD-MCNC: 13.9 G/DL (ref 12–15.9)
IMM GRANULOCYTES # BLD AUTO: 0.03 10*3/MM3 (ref 0–0.05)
IMM GRANULOCYTES NFR BLD AUTO: 0.3 % (ref 0–0.5)
LYMPHOCYTES # BLD AUTO: 2.21 10*3/MM3 (ref 0.7–3.1)
LYMPHOCYTES NFR BLD AUTO: 20.8 % (ref 19.6–45.3)
MCH RBC QN AUTO: 28.5 PG (ref 26.6–33)
MCHC RBC AUTO-ENTMCNC: 33.3 G/DL (ref 31.5–35.7)
MCV RBC AUTO: 85.5 FL (ref 79–97)
MONOCYTES # BLD AUTO: 0.86 10*3/MM3 (ref 0.1–0.9)
MONOCYTES NFR BLD AUTO: 8.1 % (ref 5–12)
NEUTROPHILS NFR BLD AUTO: 68.5 % (ref 42.7–76)
NEUTROPHILS NFR BLD AUTO: 7.27 10*3/MM3 (ref 1.7–7)
NRBC BLD AUTO-RTO: 0 /100 WBC (ref 0–0.2)
PLATELET # BLD AUTO: 405 10*3/MM3 (ref 140–450)
PMV BLD AUTO: 10.8 FL (ref 6–12)
RBC # BLD AUTO: 4.88 10*6/MM3 (ref 3.77–5.28)
VIT B12 BLD-MCNC: 321 PG/ML (ref 211–946)
WBC NRBC COR # BLD AUTO: 10.62 10*3/MM3 (ref 3.4–10.8)